# Patient Record
Sex: FEMALE | Race: WHITE | Employment: FULL TIME | URBAN - METROPOLITAN AREA
[De-identification: names, ages, dates, MRNs, and addresses within clinical notes are randomized per-mention and may not be internally consistent; named-entity substitution may affect disease eponyms.]

---

## 2017-06-29 ENCOUNTER — HOSPITAL ENCOUNTER (EMERGENCY)
Age: 37
Discharge: HOME | End: 2017-06-29
Attending: EMERGENCY MEDICINE
Payer: MEDICAID

## 2017-06-29 VITALS
HEIGHT: 70 IN | DIASTOLIC BLOOD PRESSURE: 78 MMHG | SYSTOLIC BLOOD PRESSURE: 101 MMHG | TEMPERATURE: 98 F | OXYGEN SATURATION: 100 % | RESPIRATION RATE: 17 BRPM | HEART RATE: 72 BPM

## 2017-06-29 DIAGNOSIS — R10.9 FLANK PAIN: Primary | ICD-10-CM

## 2017-06-29 LAB — HCG UR QL: NEGATIVE

## 2017-06-29 PROCEDURE — 99283 EMERGENCY DEPT VISIT LOW MDM: CPT | Performed by: EMERGENCY MEDICINE

## 2017-06-29 PROCEDURE — 81003 URINALYSIS AUTO W/O SCOPE: CPT | Performed by: EMERGENCY MEDICINE

## 2017-06-29 PROCEDURE — 96372 THER/PROPH/DIAG INJ SC/IM: CPT | Performed by: EMERGENCY MEDICINE

## 2017-06-29 PROCEDURE — 74011250636 HC RX REV CODE- 250/636: Performed by: EMERGENCY MEDICINE

## 2017-06-29 PROCEDURE — 81025 URINE PREGNANCY TEST: CPT

## 2017-06-29 RX ORDER — KETOROLAC TROMETHAMINE 30 MG/ML
60 INJECTION, SOLUTION INTRAMUSCULAR; INTRAVENOUS
Status: COMPLETED | OUTPATIENT
Start: 2017-06-29 | End: 2017-06-29

## 2017-06-29 RX ORDER — NAPROXEN 500 MG/1
500 TABLET ORAL 2 TIMES DAILY WITH MEALS
Qty: 20 TAB | Refills: 0 | Status: SHIPPED | OUTPATIENT
Start: 2017-06-29 | End: 2017-07-09

## 2017-06-29 RX ADMIN — KETOROLAC TROMETHAMINE 60 MG: 30 INJECTION, SOLUTION INTRAMUSCULAR at 15:06

## 2017-06-29 NOTE — DISCHARGE INSTRUCTIONS
Flank Pain: Care Instructions  Your Care Instructions  Flank pain is pain on the side of the back just below the rib cage and above the waist. It can be on one or both sides. Flank pain has many possible causes, including a kidney stone, a urinary tract infection, or back strain. Flank pain may get better on its own. But don't ignore new symptoms, such as fever, nausea and vomiting, urination problems, pain that gets worse, and dizziness. These may be signs of a more serious problem. You may have to have tests or other treatment. Follow-up care is a key part of your treatment and safety. Be sure to make and go to all appointments, and call your doctor if you are having problems. It's also a good idea to know your test results and keep a list of the medicines you take. How can you care for yourself at home? · Rest until you feel better. · Take pain medicines exactly as directed. ¨ If the doctor gave you a prescription medicine for pain, take it as prescribed. ¨ If you are not taking a prescription pain medicine, ask your doctor if you can take an over-the-counter pain medicine, such as acetaminophen (Tylenol), ibuprofen (Advil, Motrin), or naproxen (Aleve). Read and follow all instructions on the label. · If your doctor prescribed antibiotics, take them as directed. Do not stop taking them just because you feel better. You need to take the full course of antibiotics. · To apply heat, put a warm water bottle, a heating pad set on low, or a warm cloth on the painful area. Do not go to sleep with a heating pad on your skin. · To prevent dehydration, drink plenty of fluids, enough so that your urine is light yellow or clear like water. Choose water and other caffeine-free clear liquids until you feel better. If you have kidney, heart, or liver disease and have to limit fluids, talk with your doctor before you increase the amount of fluids you drink. When should you call for help?   Call your doctor now or seek immediate medical care if:  · Your flank pain gets worse. · You have new symptoms, such as fever, nausea, or vomiting. · You have symptoms of a urinary problem. For example:  ¨ You have blood or pus in your urine. ¨ You have chills or body aches. ¨ It hurts to urinate. ¨ You have groin or belly pain. Watch closely for changes in your health, and be sure to contact your doctor if you do not get better as expected. Where can you learn more? Go to http://cheng-jesus.info/. Enter S191 in the search box to learn more about \"Flank Pain: Care Instructions. \"  Current as of: March 20, 2017  Content Version: 11.3  © 9465-2747 [employer]. Care instructions adapted under license by [employer] (which disclaims liability or warranty for this information). If you have questions about a medical condition or this instruction, always ask your healthcare professional. [place] [address] any warranty or liability for your use of this information.

## 2017-06-29 NOTE — ED PROVIDER NOTES
HPI Comments: Here with pain to her right lower back just below the rib margin. Pain radiates around to the anterior abdomen. No redness or rash. No nausea vomiting diarrhea. No gallbladder symptoms. Noted no hematuria. No history of kidney stones. On re-questioning patient is whether she had any other thoughts on what provoked this she states her  recalled that  When they were playing basketball 2 days ago with her children and she had a grimacing look at one point when she landed. Her direct trauma. Patient is a 42 y.o. female presenting with back pain. The history is provided by the patient. Back Pain    This is a new problem. The pain is present in the right side. Radiates to: upper abdomen. The symptoms are aggravated by twisting and certain positions. Pertinent negatives include no fever, no numbness, no abdominal pain, no abdominal swelling, no bowel incontinence, no perianal numbness, no bladder incontinence, no paresthesias, no paresis, no tingling and no weakness. Treatments tried: on chronic pain meds ( Norco for left shoulder  seperation)        No past medical history on file. No past surgical history on file. No family history on file. Social History     Social History    Marital status:      Spouse name: N/A    Number of children: N/A    Years of education: N/A     Occupational History    Not on file. Social History Main Topics    Smoking status: Not on file    Smokeless tobacco: Not on file    Alcohol use Not on file    Drug use: Not on file    Sexual activity: Not on file     Other Topics Concern    Not on file     Social History Narrative         ALLERGIES: Tramadol    Review of Systems   Constitutional: Negative for chills and fever. Respiratory: Negative. Cardiovascular: Negative. Gastrointestinal: Negative for abdominal pain and bowel incontinence. Genitourinary: Negative. Negative for bladder incontinence.    Musculoskeletal: Positive for back pain. Neurological: Negative. Negative for tingling, weakness, numbness and paresthesias. All other systems reviewed and are negative. Vitals:    06/29/17 1209   BP: (P) 136/80   Pulse: (P) 100   Resp: (P) 18   Temp: (P) 98 °F (36.7 °C)   SpO2: (P) 98%   Height: (P) 5' 10\" (1.778 m)            Physical Exam   Constitutional: She appears well-developed and well-nourished. No distress. HENT:   Head: Atraumatic. Eyes: No scleral icterus. Neck: Neck supple. Cardiovascular: Normal rate. Pulmonary/Chest: Effort normal. No respiratory distress. Abdominal: She exhibits no distension. There is no tenderness. There is no rebound. Focal area of soreness   Musculoskeletal: Normal range of motion. She exhibits no edema or deformity. Neurological: She is alert. She exhibits normal muscle tone. Coordination normal.   Skin: Skin is warm and dry. Psychiatric: Her behavior is normal. Thought content normal.   Flat affect     Nursing note and vitals reviewed. MDM  Number of Diagnoses or Management Options  Diagnosis management comments: Pain best when lays still. No redness or rash. No urinary changes. Best when still.  On pain management so will not address that category       Amount and/or Complexity of Data Reviewed  Clinical lab tests: reviewed and ordered    Risk of Complications, Morbidity, and/or Mortality  Presenting problems: moderate  Diagnostic procedures: low  Management options: moderate    Patient Progress  Patient progress: stable    ED Course       Procedures

## 2017-06-29 NOTE — LETTER
NOTIFICATION RETURN TO WORK / SCHOOL 
 
7/1/2017 12:47 PM 
 
Ms. Nilam Power [address] [facility] [address] To Whom It May Concern: 
 
Nilam Power is currently under the care of [facility] EMERGENCY DEPT. She will return to work/school on 7/2/2017 If there are questions or concerns please have the patient contact our office. Sincerely, Lorene Webster RN

## 2017-06-29 NOTE — LETTER
[address] [facility] EMERGENCY DEPT One [address] [facility] [place] [address] 
[phone] Work/School Note Date: 6/29/2017 To Whom It May concern: 
 
Betty Cox was seen and treated today in the emergency room by the following provider(s): 
No providers found. Betty Cox may return to work on 7/3/17. Sincerely, Suraj Jung RN

## 2017-06-29 NOTE — ED TRIAGE NOTES
Pt complains of a \" really, really sharp pain\" in her lower back and right flank. Pt states that the pain is worse upon movement. Pt states that the pain started approx 30 minutes prior to arriving at this facility.

## 2018-03-22 ENCOUNTER — HOSPITAL ENCOUNTER (EMERGENCY)
Age: 38
Discharge: HOME | End: 2018-03-22
Attending: EMERGENCY MEDICINE
Payer: MEDICAID

## 2018-03-22 ENCOUNTER — APPOINTMENT (OUTPATIENT)
Dept: CT IMAGING | Age: 38
End: 2018-03-22
Attending: EMERGENCY MEDICINE
Payer: MEDICAID

## 2018-03-22 VITALS
DIASTOLIC BLOOD PRESSURE: 69 MMHG | RESPIRATION RATE: 14 BRPM | SYSTOLIC BLOOD PRESSURE: 115 MMHG | TEMPERATURE: 98.1 F | HEART RATE: 75 BPM | OXYGEN SATURATION: 98 %

## 2018-03-22 DIAGNOSIS — G43.801 OTHER MIGRAINE WITH STATUS MIGRAINOSUS, NOT INTRACTABLE: Primary | ICD-10-CM

## 2018-03-22 LAB
ANION GAP SERPL CALC-SCNC: 7 MMOL/L (ref 7–16)
BASOPHILS # BLD: 0.1 K/UL (ref 0–0.2)
BASOPHILS NFR BLD: 1 % (ref 0–2)
BUN SERPL-MCNC: 8 MG/DL (ref 6–23)
CALCIUM SERPL-MCNC: 8.7 MG/DL (ref 8.3–10.4)
CHLORIDE SERPL-SCNC: 109 MMOL/L (ref 98–107)
CO2 SERPL-SCNC: 26 MMOL/L (ref 21–32)
CREAT SERPL-MCNC: 0.84 MG/DL (ref 0.6–1)
CRP SERPL-MCNC: 0.4 MG/DL (ref 0–0.9)
D DIMER PPP FEU-MCNC: 1.28 UG/ML(FEU)
DIFFERENTIAL METHOD BLD: ABNORMAL
EOSINOPHIL # BLD: 0.1 K/UL (ref 0–0.8)
EOSINOPHIL NFR BLD: 1 % (ref 0.5–7.8)
ERYTHROCYTE [DISTWIDTH] IN BLOOD BY AUTOMATED COUNT: 13.2 % (ref 11.9–14.6)
GLUCOSE SERPL-MCNC: 93 MG/DL (ref 65–100)
HCT VFR BLD AUTO: 46.8 % (ref 35.8–46.3)
HGB BLD-MCNC: 15.8 G/DL (ref 11.7–15.4)
IMM GRANULOCYTES # BLD: 0 K/UL (ref 0–0.5)
IMM GRANULOCYTES NFR BLD AUTO: 0 % (ref 0–5)
LYMPHOCYTES # BLD: 1.9 K/UL (ref 0.5–4.6)
LYMPHOCYTES NFR BLD: 20 % (ref 13–44)
MCH RBC QN AUTO: 31.3 PG (ref 26.1–32.9)
MCHC RBC AUTO-ENTMCNC: 33.8 G/DL (ref 31.4–35)
MCV RBC AUTO: 92.7 FL (ref 79.6–97.8)
MONOCYTES # BLD: 0.7 K/UL (ref 0.1–1.3)
MONOCYTES NFR BLD: 8 % (ref 4–12)
NEUTS SEG # BLD: 6.8 K/UL (ref 1.7–8.2)
NEUTS SEG NFR BLD: 70 % (ref 43–78)
PLATELET # BLD AUTO: 256 K/UL (ref 150–450)
PMV BLD AUTO: 10.9 FL (ref 10.8–14.1)
POTASSIUM SERPL-SCNC: 3.8 MMOL/L (ref 3.5–5.1)
RBC # BLD AUTO: 5.05 M/UL (ref 4.05–5.25)
SODIUM SERPL-SCNC: 142 MMOL/L (ref 136–145)
WBC # BLD AUTO: 9.6 K/UL (ref 4.3–11.1)

## 2018-03-22 PROCEDURE — 74011250636 HC RX REV CODE- 250/636: Performed by: EMERGENCY MEDICINE

## 2018-03-22 PROCEDURE — 96374 THER/PROPH/DIAG INJ IV PUSH: CPT | Performed by: EMERGENCY MEDICINE

## 2018-03-22 PROCEDURE — 96361 HYDRATE IV INFUSION ADD-ON: CPT | Performed by: EMERGENCY MEDICINE

## 2018-03-22 PROCEDURE — 70470 CT HEAD/BRAIN W/O & W/DYE: CPT

## 2018-03-22 PROCEDURE — 86140 C-REACTIVE PROTEIN: CPT | Performed by: EMERGENCY MEDICINE

## 2018-03-22 PROCEDURE — 74011636320 HC RX REV CODE- 636/320: Performed by: EMERGENCY MEDICINE

## 2018-03-22 PROCEDURE — 96375 TX/PRO/DX INJ NEW DRUG ADDON: CPT | Performed by: EMERGENCY MEDICINE

## 2018-03-22 PROCEDURE — 99285 EMERGENCY DEPT VISIT HI MDM: CPT | Performed by: EMERGENCY MEDICINE

## 2018-03-22 PROCEDURE — 85025 COMPLETE CBC W/AUTO DIFF WBC: CPT | Performed by: EMERGENCY MEDICINE

## 2018-03-22 PROCEDURE — 85379 FIBRIN DEGRADATION QUANT: CPT | Performed by: EMERGENCY MEDICINE

## 2018-03-22 PROCEDURE — 74011000258 HC RX REV CODE- 258: Performed by: EMERGENCY MEDICINE

## 2018-03-22 PROCEDURE — 80048 BASIC METABOLIC PNL TOTAL CA: CPT | Performed by: EMERGENCY MEDICINE

## 2018-03-22 RX ORDER — PROMETHAZINE HYDROCHLORIDE 25 MG/1
25 TABLET ORAL
Qty: 12 TAB | Refills: 0 | Status: SHIPPED | OUTPATIENT
Start: 2018-03-22

## 2018-03-22 RX ORDER — SODIUM CHLORIDE 0.9 % (FLUSH) 0.9 %
5-10 SYRINGE (ML) INJECTION AS NEEDED
Status: DISCONTINUED | OUTPATIENT
Start: 2018-03-22 | End: 2018-03-22 | Stop reason: HOSPADM

## 2018-03-22 RX ORDER — DEXAMETHASONE SODIUM PHOSPHATE 100 MG/10ML
4 INJECTION INTRAMUSCULAR; INTRAVENOUS
Status: COMPLETED | OUTPATIENT
Start: 2018-03-22 | End: 2018-03-22

## 2018-03-22 RX ORDER — SODIUM CHLORIDE 0.9 % (FLUSH) 0.9 %
5-10 SYRINGE (ML) INJECTION EVERY 8 HOURS
Status: DISCONTINUED | OUTPATIENT
Start: 2018-03-22 | End: 2018-03-22 | Stop reason: HOSPADM

## 2018-03-22 RX ORDER — SODIUM CHLORIDE 0.9 % (FLUSH) 0.9 %
10 SYRINGE (ML) INJECTION
Status: COMPLETED | OUTPATIENT
Start: 2018-03-22 | End: 2018-03-22

## 2018-03-22 RX ORDER — NALBUPHINE HYDROCHLORIDE 10 MG/ML
10 INJECTION, SOLUTION INTRAMUSCULAR; INTRAVENOUS; SUBCUTANEOUS
Status: COMPLETED | OUTPATIENT
Start: 2018-03-22 | End: 2018-03-22

## 2018-03-22 RX ORDER — BUTALBITAL, ACETAMINOPHEN, CAFFEINE AND CODEINE PHOSPHATE 50; 325; 40; 30 MG/1; MG/1; MG/1; MG/1
CAPSULE ORAL
COMMUNITY

## 2018-03-22 RX ORDER — METOCLOPRAMIDE HYDROCHLORIDE 5 MG/ML
10 INJECTION INTRAMUSCULAR; INTRAVENOUS
Status: COMPLETED | OUTPATIENT
Start: 2018-03-22 | End: 2018-03-22

## 2018-03-22 RX ORDER — KETOROLAC TROMETHAMINE 30 MG/ML
30 INJECTION, SOLUTION INTRAMUSCULAR; INTRAVENOUS
Status: COMPLETED | OUTPATIENT
Start: 2018-03-22 | End: 2018-03-22

## 2018-03-22 RX ORDER — SUMATRIPTAN 100 MG/1
100 TABLET, FILM COATED ORAL
COMMUNITY

## 2018-03-22 RX ADMIN — KETOROLAC TROMETHAMINE 30 MG: 30 INJECTION, SOLUTION INTRAMUSCULAR at 15:26

## 2018-03-22 RX ADMIN — NALBUPHINE HYDROCHLORIDE 10 MG: 10 INJECTION, SOLUTION INTRAMUSCULAR; INTRAVENOUS; SUBCUTANEOUS at 11:40

## 2018-03-22 RX ADMIN — IOPAMIDOL 100 ML: 755 INJECTION, SOLUTION INTRAVENOUS at 14:15

## 2018-03-22 RX ADMIN — METOCLOPRAMIDE 10 MG: 5 INJECTION, SOLUTION INTRAMUSCULAR; INTRAVENOUS at 12:33

## 2018-03-22 RX ADMIN — SODIUM CHLORIDE 100 ML: 900 INJECTION, SOLUTION INTRAVENOUS at 14:15

## 2018-03-22 RX ADMIN — Medication 10 ML: at 14:16

## 2018-03-22 RX ADMIN — DEXAMETHASONE SODIUM PHOSPHATE 4 MG: 10 INJECTION INTRAMUSCULAR; INTRAVENOUS at 12:33

## 2018-03-22 RX ADMIN — SODIUM CHLORIDE 1000 ML: 900 INJECTION, SOLUTION INTRAVENOUS at 12:32

## 2018-03-22 NOTE — ED PROVIDER NOTES
HPI Comments: Patient presents to the [facility] on complaining of severe migraine headache. She states she's had her typical migraine which involves pain on the top of the past week despite taking Urised and Imitrex at home this morning she woke up the headache it changed and is now becoming isolated to the left side of the head she denies any fever or chills or sinus congestion she did have an episode of vomiting this morning but is not nauseated at this time she denies any earache she denies any paresthesias or other lateralizing neurological complaints or disequilibrium. Currently rating the pain as 8/10 in intensity. Patient is a 34 y.o. female presenting with headaches. The history is provided by the patient. Headache    This is a recurrent problem. The current episode started more than 1 week ago. The problem occurs constantly. Progression since onset: changes to the left unilateral. Associated with: History of migraine headaches. The pain is located in the left unilateral region. The quality of the pain is described as sharp and throbbing. The pain is at a severity of 8/10. The pain is severe. Associated symptoms include nausea and vomiting. Pertinent negatives include no anorexia, no fever, no malaise/fatigue, no chest pressure, no near-syncope, no orthopnea, no palpitations, no syncope, no shortness of breath, no weakness, no tingling, no dizziness and no visual change. She has tried triptan therapy (Fioricet and Imitrex) for the symptoms. The treatment provided no relief. No past medical history on file. No past surgical history on file. No family history on file. Social History     Social History    Marital status:      Spouse name: N/A    Number of children: N/A    Years of education: N/A     Occupational History    Not on file.      Social History Main Topics    Smoking status: Not on file    Smokeless tobacco: Not on file    Alcohol use Not on file    Drug use: Not on file    Sexual activity: Not on file     Other Topics Concern    Not on file     Social History Narrative         ALLERGIES: Tramadol    Review of Systems   Constitutional: Negative for fever and malaise/fatigue. Respiratory: Negative for shortness of breath. Cardiovascular: Negative for palpitations, orthopnea, syncope and near-syncope. Gastrointestinal: Positive for nausea and vomiting. Negative for anorexia. Neurological: Positive for headaches. Negative for dizziness, tingling and weakness. All other systems reviewed and are negative. Vitals:    03/22/18 1117   BP: (!) 148/100   Pulse: 97   Resp: 14   Temp: 98 °F (36.7 °C)   SpO2: 98%            Physical Exam   Constitutional: She is oriented to person, place, and time. She appears well-developed and well-nourished. No distress. HENT:   Head: Normocephalic and atraumatic. Eyes: Conjunctivae and EOM are normal. Pupils are equal, round, and reactive to light. Neck: Normal range of motion. Neck supple. No JVD present. No meningismus   Cardiovascular: Normal rate. Pulmonary/Chest: Effort normal.   Abdominal: Soft. There is no tenderness. Musculoskeletal: Normal range of motion. She exhibits no edema, tenderness or deformity. Lymphadenopathy:     She has no cervical adenopathy. Neurological: She is alert and oriented to person, place, and time. She has normal reflexes. She displays normal reflexes. No cranial nerve deficit. She exhibits normal muscle tone. Coordination normal.   No pronator drift  Or incoordination is noted   Skin: Skin is warm and dry. Psychiatric: She has a normal mood and affect. Her behavior is normal.   Nursing note and vitals reviewed.        MDM  Number of Diagnoses or Management Options     Amount and/or Complexity of Data Reviewed  Clinical lab tests: ordered and reviewed  Independent visualization of images, tracings, or specimens: yes    Risk of Complications, Morbidity, and/or Mortality  Presenting problems: moderate  Diagnostic procedures: moderate  Management options: moderate    Patient Progress  Patient progress: stable        ED Course       Procedures      Patient reports symptomatic improvement with the headache severity to 5/10 in intensity. Patient will now be given a dose of Toradoll and will be discharged diagnosis of intractable migraine. Jeane Wells

## 2018-03-22 NOTE — DISCHARGE INSTRUCTIONS
Migraine Headache: Care Instructions  Your Care Instructions  Migraines are painful, throbbing headaches that often start on one side of the head. They may cause nausea and vomiting and make you sensitive to light, sound, or smell. Without treatment, migraines can last from 4 hours to a few days. Medicines can help prevent migraines or stop them after they have started. Your doctor can help you find which ones work best for you. Follow-up care is a key part of your treatment and safety. Be sure to make and go to all appointments, and call your doctor if you are having problems. It's also a good idea to know your test results and keep a list of the medicines you take. How can you care for yourself at home? · Do not drive if you have taken a prescription pain medicine. · Rest in a quiet, dark room until your headache is gone. Close your eyes, and try to relax or go to sleep. Don't watch TV or read. · Put a cold, moist cloth or cold pack on the painful area for 10 to 20 minutes at a time. Put a thin cloth between the cold pack and your skin. · Use a warm, moist towel or a heating pad set on low to relax tight shoulder and neck muscles. · Have someone gently massage your neck and shoulders. · Take your medicines exactly as prescribed. Call your doctor if you think you are having a problem with your medicine. You will get more details on the specific medicines your doctor prescribes. · Be careful not to take pain medicine more often than the instructions allow. You could get worse or more frequent headaches when the medicine wears off. To prevent migraines  · Keep a headache diary so you can figure out what triggers your headaches. Avoiding triggers may help you prevent headaches. Record when each headache began, how long it lasted, and what the pain was like.  (Was it throbbing, aching, stabbing, or dull?) Write down any other symptoms you had with the headache, such as nausea, flashing lights or dark spots, or sensitivity to bright light or loud noise. Note if the headache occurred near your period. List anything that might have triggered the headache. Triggers may include certain foods (chocolate, cheese, wine) or odors, smoke, bright light, stress, or lack of sleep. · If your doctor has prescribed medicine for your migraines, take it as directed. You may have medicine that you take only when you get a migraine and medicine that you take all the time to help prevent migraines. ¨ If your doctor has prescribed medicine for when you get a headache, take it at the first sign of a migraine, unless your doctor has given you other instructions. ¨ If your doctor has prescribed medicine to prevent migraines, take it exactly as prescribed. Call your doctor if you think you are having a problem with your medicine. · Find healthy ways to deal with stress. Migraines are most common during or right after stressful times. Take time to relax before and after you do something that has caused a migraine in the past.  · Try to keep your muscles relaxed by keeping good posture. Check your jaw, face, neck, and shoulder muscles for tension. Try to relax them. When you sit at a desk, change positions often. And make sure to stretch for 30 seconds each hour. · Get plenty of sleep and exercise. · Eat meals on a regular schedule. Avoid foods and drinks that often trigger migraines. These include chocolate, alcohol (especially red wine and port), aspartame, monosodium glutamate (MSG), and some additives found in foods (such as hot dogs, estrada, cold cuts, aged cheeses, and pickled foods). · Limit caffeine. Don't drink too much coffee, tea, or soda. But don't quit caffeine suddenly. That can also give you migraines. · Do not smoke or allow others to smoke around you. If you need help quitting, talk to your doctor about stop-smoking programs and medicines. These can increase your chances of quitting for good.   · If you are taking birth control pills or hormone therapy, talk to your doctor about whether they are triggering your migraines. When should you call for help? Call 911 anytime you think you may need emergency care. For example, call if:  ? · You have signs of a stroke. These may include:  ¨ Sudden numbness, paralysis, or weakness in your face, arm, or leg, especially on only one side of your body. ¨ Sudden vision changes. ¨ Sudden trouble speaking. ¨ Sudden confusion or trouble understanding simple statements. ¨ Sudden problems with walking or balance. ¨ A sudden, severe headache that is different from past headaches. ?Call your doctor now or seek immediate medical care if:  ? · You have new or worse nausea and vomiting. ? · You have a new or higher fever. ? · Your headache gets much worse. ? Watch closely for changes in your health, and be sure to contact your doctor if:  ? · You are not getting better after 2 days (48 hours). Where can you learn more? Go to http://cheng-jesus.info/. Enter N823 in the search box to learn more about \"Migraine Headache: Care Instructions. \"  Current as of: October 14, 2016  Content Version: 11.4  © 1664-4721 [employer]. Care instructions adapted under license by [employer] (which disclaims liability or warranty for this information). If you have questions about a medical condition or this instruction, always ask your healthcare professional. [place] [address] any warranty or liability for your use of this information.

## 2018-03-22 NOTE — ED NOTES
I have reviewed discharge instructions with the patient. The patient verbalized understanding. Patient left ED via Discharge Method: ambulatory to Home with spouse. Opportunity for questions and clarification provided. Patient given 1 scripts. To continue your aftercare when you leave the hospital, you may receive an automated call from our care team to check in on how you are doing. This is a free service and part of our promise to provide the best care and service to meet your aftercare needs.  If you have questions, or wish to unsubscribe from this service please call [phone]. Thank you for Choosing our [facility] Emergency Department.

## 2018-03-22 NOTE — LETTER
[address] [facility] EMERGENCY DEPT One [address] [id number] [phone] Work/School Note Date: 3/22/2018 To Whom It May concern: 
 
Tahira Reyes was seen and treated today in the emergency room by the following provider(s): 
Attending Provider: David Hart DO. Tahira Reyes is allowed to return to work on Saturday March 24, 2018. Sincerely, Honey Pemberton

## 2018-04-02 ENCOUNTER — HOSPITAL ENCOUNTER (EMERGENCY)
Age: 38
Discharge: HOME | End: 2018-04-02
Attending: EMERGENCY MEDICINE
Payer: MEDICAID

## 2018-04-02 VITALS
BODY MASS INDEX: 25.34 KG/M2 | TEMPERATURE: 98.3 F | WEIGHT: 177 LBS | HEART RATE: 69 BPM | OXYGEN SATURATION: 99 % | DIASTOLIC BLOOD PRESSURE: 77 MMHG | HEIGHT: 70 IN | RESPIRATION RATE: 18 BRPM | SYSTOLIC BLOOD PRESSURE: 112 MMHG

## 2018-04-02 DIAGNOSIS — G43.001 MIGRAINE WITHOUT AURA AND WITH STATUS MIGRAINOSUS, NOT INTRACTABLE: Primary | ICD-10-CM

## 2018-04-02 PROCEDURE — 96374 THER/PROPH/DIAG INJ IV PUSH: CPT | Performed by: EMERGENCY MEDICINE

## 2018-04-02 PROCEDURE — 74011000250 HC RX REV CODE- 250: Performed by: EMERGENCY MEDICINE

## 2018-04-02 PROCEDURE — 96375 TX/PRO/DX INJ NEW DRUG ADDON: CPT | Performed by: EMERGENCY MEDICINE

## 2018-04-02 PROCEDURE — 74011250637 HC RX REV CODE- 250/637: Performed by: EMERGENCY MEDICINE

## 2018-04-02 PROCEDURE — 99283 EMERGENCY DEPT VISIT LOW MDM: CPT | Performed by: EMERGENCY MEDICINE

## 2018-04-02 PROCEDURE — 74011250636 HC RX REV CODE- 250/636: Performed by: EMERGENCY MEDICINE

## 2018-04-02 RX ORDER — METOCLOPRAMIDE HYDROCHLORIDE 5 MG/ML
10 INJECTION INTRAMUSCULAR; INTRAVENOUS
Status: COMPLETED | OUTPATIENT
Start: 2018-04-02 | End: 2018-04-02

## 2018-04-02 RX ORDER — METOCLOPRAMIDE 10 MG/1
10 TABLET ORAL
Qty: 12 TAB | Refills: 0 | Status: SHIPPED | OUTPATIENT
Start: 2018-04-02 | End: 2018-04-12

## 2018-04-02 RX ORDER — BUPIVACAINE HYDROCHLORIDE 7.5 MG/ML
10 INJECTION, SOLUTION EPIDURAL; RETROBULBAR ONCE
Status: COMPLETED | OUTPATIENT
Start: 2018-04-02 | End: 2018-04-02

## 2018-04-02 RX ORDER — DIPHENHYDRAMINE HYDROCHLORIDE 50 MG/ML
12.5 INJECTION, SOLUTION INTRAMUSCULAR; INTRAVENOUS ONCE
Status: COMPLETED | OUTPATIENT
Start: 2018-04-02 | End: 2018-04-02

## 2018-04-02 RX ORDER — ACETAMINOPHEN 500 MG
1000 TABLET ORAL
Status: COMPLETED | OUTPATIENT
Start: 2018-04-02 | End: 2018-04-02

## 2018-04-02 RX ORDER — SODIUM CHLORIDE 9 MG/ML
1000 INJECTION, SOLUTION INTRAVENOUS ONCE
Status: COMPLETED | OUTPATIENT
Start: 2018-04-02 | End: 2018-04-02

## 2018-04-02 RX ORDER — DEXAMETHASONE SODIUM PHOSPHATE 100 MG/10ML
10 INJECTION INTRAMUSCULAR; INTRAVENOUS
Status: COMPLETED | OUTPATIENT
Start: 2018-04-02 | End: 2018-04-02

## 2018-04-02 RX ORDER — BUPIVACAINE HYDROCHLORIDE 5 MG/ML
10 INJECTION, SOLUTION EPIDURAL; INTRACAUDAL ONCE
Status: DISCONTINUED | OUTPATIENT
Start: 2018-04-02 | End: 2018-04-02 | Stop reason: ALTCHOICE

## 2018-04-02 RX ORDER — KETOROLAC TROMETHAMINE 30 MG/ML
15 INJECTION, SOLUTION INTRAMUSCULAR; INTRAVENOUS
Status: COMPLETED | OUTPATIENT
Start: 2018-04-02 | End: 2018-04-02

## 2018-04-02 RX ADMIN — DEXAMETHASONE SODIUM PHOSPHATE 10 MG: 10 INJECTION INTRAMUSCULAR; INTRAVENOUS at 12:06

## 2018-04-02 RX ADMIN — SODIUM CHLORIDE 1000 ML: 900 INJECTION, SOLUTION INTRAVENOUS at 12:03

## 2018-04-02 RX ADMIN — KETOROLAC TROMETHAMINE 15 MG: 30 INJECTION, SOLUTION INTRAMUSCULAR at 12:04

## 2018-04-02 RX ADMIN — BUPIVACAINE HYDROCHLORIDE 75 MG: 7.5 INJECTION, SOLUTION EPIDURAL; RETROBULBAR at 15:19

## 2018-04-02 RX ADMIN — METOCLOPRAMIDE 10 MG: 5 INJECTION, SOLUTION INTRAMUSCULAR; INTRAVENOUS at 12:05

## 2018-04-02 RX ADMIN — DIPHENHYDRAMINE HYDROCHLORIDE 12.5 MG: 50 INJECTION, SOLUTION INTRAMUSCULAR; INTRAVENOUS at 12:06

## 2018-04-02 RX ADMIN — ACETAMINOPHEN 1000 MG: 500 TABLET, FILM COATED ORAL at 12:03

## 2018-04-02 NOTE — LETTER
[address] [facility] EMERGENCY DEPT One [address] [id number] [phone] Work/School Note Date: 4/2/2018 To Whom It May concern: 
 
Donna Caballero was seen and treated today in the emergency room by the following provider(s): 
No providers found. Donna Caballero may return to work on 04/04/2018.  
 
Sincerely, 
 
 
 
 
Asmita Robert RN

## 2018-04-02 NOTE — Clinical Note
You have a migraine headache - one of the most common types of headaches. They are usually on one side of the head, worse with light and noise, feel like a throbbing and may cause nausea or vomiting. At the first sign of a headache: 
Take 2 regular ty lenol Take 2 motrin Drink 2 glasses of water Lay down for 2 hours If you are having frequent headaches, try keeping a headache diary. Track foods, activities, etc that seem to go along with your headaches. Common triggers are: 
Alcohol Caffein e (too much or not enough) Chocolate Some cheeses [employer]. com/

## 2018-04-02 NOTE — ED TRIAGE NOTES
Patient reports migraine, history of. Took all prescribed medications, but denies improvement. Symptoms began 4 days ago. Intermittent blurry vision. Pupils dilated in triage. Line placed for medication purposes.

## 2018-04-02 NOTE — ED PROVIDER NOTES
CDNotes Templates                    Emergency Department     Chief Complaint:  Left-sided headache  HPI:  40-year-old female with 10 year history of migraines presents to the emergency department with intractable migraine headache of 3 weeks duration. She's been taking over-the-counter and prescription medicines at home without relief. Was seen here several days ago. Treated in the ED and discharged home on Phenergan. Continues to have headache. She's been seen at [facility]'s care. [facility]    Left sided. Throbbing headache. No alleviating. Had a similar intractable headache about 5 years ago. Reports it lasted for 3 months during the winter. She visited [facility] every day during a period    The headache(s) started days ago then resolved came back this morning  The headache is described as achy  Onset of headache was gradual  The severity of the headache is moderate  Associated symptoms include: nausea, photophobia  Historian:   patient  Review of Systems:  Include pertinent positives and negatives. CONST:  Denies: fever  ENT:  Denies: sore throat, nasal congestion  EYES:  No blurred vision. CARDIO: Denies: chest pain, palpitations   RESP:  Denies: cough, shortness of breath  GI:  Denies: abdominal pain, nausea, vomiting  :                  Denies: dysuria, loss of bladder control  MUSC: Denies: muscle aches, joint pain, muscle weakness  SKIN:  Denies: rash  NEURO: No focal weakness. No seizure. No confusion  Past Medical History:  Migraine headache  No past surgical history on file. Social History   Substance Use Topics    Smoking status: Not on file    Smokeless tobacco: Not on file    Alcohol use Not on file     No family history on file. Previous Medications    CODEINE-BUTALBITAL-ACETAMINOPHEN-CAFFEINE (FIORICET WITH CODEINE) -08-30 MG CAPSULE    Take  by mouth.     PROMETHAZINE (PHENERGAN) 25 MG TABLET    Take 1 Tab by mouth every six (6) hours as needed for Nausea. SUMATRIPTAN (IMITREX) 100 MG TABLET    Take 100 mg by mouth once as needed for Migraine. Allergies as of 04/02/2018 - Review Complete 04/02/2018   Allergen Reaction Noted    Tramadol Other (comments) 06/29/2017       Physical Exam:    Vital signs:   Visit Vitals    /69 (BP 1 Location: Right arm, BP Patient Position: At rest)    Pulse (!) 106    Temp 98.2 °F (36.8 °C)    Resp 16    Ht 5' 10\" (1.778 m)    Wt 80.3 kg (177 lb)    SpO2 96%    BMI 25.4 kg/m2       Vital signs were reviewed. Pulse oximetry interpretation: normal    General Appear: no distress  Head:   nnormocephalic atraumatic  Ears/Nose/Throat: pharynx clear, mucous membranes moist  Eyes:   Pupils equal and reactive  Neck:   FROM, no adenopathy, no meningismus  Cardiovascular: regular rate and rhythm, no murmur  Respiratory:  clear to auscultation bilaterally  Musculoskeletal: Moves extremities without difficulty  Skin:   dry, no rash  Neurologic:  Awake alert oriented answers questions follows commands. Tongue and facial movements are intact    _______________________________________________________________________    LABS/RADIOLOGY/EKG:    Labs:   Lab work from last month were reviewed  Results for orders placed or performed during the hospital encounter of 03/22/18   CBC WITH AUTOMATED DIFF   Result Value Ref Range    WBC 9.6 4.3 - 11.1 K/uL    RBC 5.05 4.05 - 5.25 M/uL    HGB 15.8 (H) 11.7 - 15.4 g/dL    HCT 46.8 (H) 35.8 - 46.3 %    MCV 92.7 79.6 - 97.8 FL    MCH 31.3 26.1 - 32.9 PG    MCHC 33.8 31.4 - 35.0 g/dL    RDW 13.2 11.9 - 14.6 %    PLATELET 963 931 - 130 K/uL    MPV 10.9 10.8 - 14.1 FL    DF AUTOMATED      NEUTROPHILS 70 43 - 78 %    LYMPHOCYTES 20 13 - 44 %    MONOCYTES 8 4.0 - 12.0 %    EOSINOPHILS 1 0.5 - 7.8 %    BASOPHILS 1 0.0 - 2.0 %    IMMATURE GRANULOCYTES 0 0.0 - 5.0 %    ABS. NEUTROPHILS 6.8 1.7 - 8.2 K/UL    ABS. LYMPHOCYTES 1.9 0.5 - 4.6 K/UL    ABS.  MONOCYTES 0.7 0.1 - 1.3 K/UL    ABS. EOSINOPHILS 0.1 0.0 - 0.8 K/UL    ABS. BASOPHILS 0.1 0.0 - 0.2 K/UL    ABS. IMM. GRANS. 0.0 0.0 - 0.5 K/UL   METABOLIC PANEL, BASIC   Result Value Ref Range    Sodium 142 136 - 145 mmol/L    Potassium 3.8 3.5 - 5.1 mmol/L    Chloride 109 (H) 98 - 107 mmol/L    CO2 26 21 - 32 mmol/L    Anion gap 7 7 - 16 mmol/L    Glucose 93 65 - 100 mg/dL    BUN 8 6 - 23 MG/DL    Creatinine 0.84 0.6 - 1.0 MG/DL    GFR est AA >60 >60 ml/min/1.73m2    GFR est non-AA >60 >60 ml/min/1.73m2    Calcium 8.7 8.3 - 10.4 MG/DL   C REACTIVE PROTEIN, QT   Result Value Ref Range    C-Reactive protein 0.4 0.0 - 0.9 mg/dL   D DIMER   Result Value Ref Range    D DIMER 1.28 (HH) <0.56 ug/ml(FEU)         Labs were reviewed and interpreted by me. Radiology studies performed:    CT from 3/22 was negative for any acute abnormality  _____________________________________________________________________  Progress:    ppatient with left-sided headache typical of previous migraines. Will try multi- modal treatment here in the emergency department. Patient's headache much improved after initial medications. Like to give her occipital injection. Delay in getting medication at the bedside. But this was performed    3 cc of 0.75% Marcaine were injected on either side of the cervical spine. Tolerated well. Nursing notes were reviewed: yes      _______________________________________________________________________  Condition:  good  Disposition:  home  Diagnosis:    1.  Migraine without aura and with status migrainosus, not intractable            Cyndi Perez M.D.      Omaira Willett; version 2.0; revised April, 2016

## 2018-04-02 NOTE — DISCHARGE INSTRUCTIONS
Migraine Headache: Care Instructions  Your Care Instructions  Migraines are painful, throbbing headaches that often start on one side of the head. They may cause nausea and vomiting and make you sensitive to light, sound, or smell. Without treatment, migraines can last from 4 hours to a few days. Medicines can help prevent migraines or stop them after they have started. Your doctor can help you find which ones work best for you. Follow-up care is a key part of your treatment and safety. Be sure to make and go to all appointments, and call your doctor if you are having problems. It's also a good idea to know your test results and keep a list of the medicines you take. How can you care for yourself at home? · Do not drive if you have taken a prescription pain medicine. · Rest in a quiet, dark room until your headache is gone. Close your eyes, and try to relax or go to sleep. Don't watch TV or read. · Put a cold, moist cloth or cold pack on the painful area for 10 to 20 minutes at a time. Put a thin cloth between the cold pack and your skin. · Use a warm, moist towel or a heating pad set on low to relax tight shoulder and neck muscles. · Have someone gently massage your neck and shoulders. · Take your medicines exactly as prescribed. Call your doctor if you think you are having a problem with your medicine. You will get more details on the specific medicines your doctor prescribes. · Be careful not to take pain medicine more often than the instructions allow. You could get worse or more frequent headaches when the medicine wears off. To prevent migraines  · Keep a headache diary so you can figure out what triggers your headaches. Avoiding triggers may help you prevent headaches. Record when each headache began, how long it lasted, and what the pain was like.  (Was it throbbing, aching, stabbing, or dull?) Write down any other symptoms you had with the headache, such as nausea, flashing lights or dark spots, or sensitivity to bright light or loud noise. Note if the headache occurred near your period. List anything that might have triggered the headache. Triggers may include certain foods (chocolate, cheese, wine) or odors, smoke, bright light, stress, or lack of sleep. · If your doctor has prescribed medicine for your migraines, take it as directed. You may have medicine that you take only when you get a migraine and medicine that you take all the time to help prevent migraines. ¨ If your doctor has prescribed medicine for when you get a headache, take it at the first sign of a migraine, unless your doctor has given you other instructions. ¨ If your doctor has prescribed medicine to prevent migraines, take it exactly as prescribed. Call your doctor if you think you are having a problem with your medicine. · Find healthy ways to deal with stress. Migraines are most common during or right after stressful times. Take time to relax before and after you do something that has caused a migraine in the past.  · Try to keep your muscles relaxed by keeping good posture. Check your jaw, face, neck, and shoulder muscles for tension. Try to relax them. When you sit at a desk, change positions often. And make sure to stretch for 30 seconds each hour. · Get plenty of sleep and exercise. · Eat meals on a regular schedule. Avoid foods and drinks that often trigger migraines. These include chocolate, alcohol (especially red wine and port), aspartame, monosodium glutamate (MSG), and some additives found in foods (such as hot dogs, estrada, cold cuts, aged cheeses, and pickled foods). · Limit caffeine. Don't drink too much coffee, tea, or soda. But don't quit caffeine suddenly. That can also give you migraines. · Do not smoke or allow others to smoke around you. If you need help quitting, talk to your doctor about stop-smoking programs and medicines. These can increase your chances of quitting for good.   · If you are taking birth control pills or hormone therapy, talk to your doctor about whether they are triggering your migraines. When should you call for help? Call 911 anytime you think you may need emergency care. For example, call if:  ? · You have signs of a stroke. These may include:  ¨ Sudden numbness, paralysis, or weakness in your face, arm, or leg, especially on only one side of your body. ¨ Sudden vision changes. ¨ Sudden trouble speaking. ¨ Sudden confusion or trouble understanding simple statements. ¨ Sudden problems with walking or balance. ¨ A sudden, severe headache that is different from past headaches. ?Call your doctor now or seek immediate medical care if:  ? · You have new or worse nausea and vomiting. ? · You have a new or higher fever. ? · Your headache gets much worse. ? Watch closely for changes in your health, and be sure to contact your doctor if:  ? · You are not getting better after 2 days (48 hours). Where can you learn more? Go to http://cheng-jesus.info/. Enter I779 in the search box to learn more about \"Migraine Headache: Care Instructions. \"  Current as of: October 14, 2016  Content Version: 11.4  © 0914-1692 [employer]. Care instructions adapted under license by [employer] (which disclaims liability or warranty for this information). If you have questions about a medical condition or this instruction, always ask your healthcare professional. [place] [address] any warranty or liability for your use of this information.